# Patient Record
Sex: FEMALE | Race: WHITE
[De-identification: names, ages, dates, MRNs, and addresses within clinical notes are randomized per-mention and may not be internally consistent; named-entity substitution may affect disease eponyms.]

---

## 2017-12-15 ENCOUNTER — HOSPITAL ENCOUNTER (OUTPATIENT)
Dept: HOSPITAL 22 - RAD | Age: 82
End: 2017-12-15
Attending: INTERNAL MEDICINE
Payer: MEDICARE

## 2017-12-15 DIAGNOSIS — M54.6: Primary | ICD-10-CM

## 2017-12-15 DIAGNOSIS — J44.9: ICD-10-CM

## 2017-12-15 NOTE — RADIOLOGY REPORT PS360
CHEST(2 VIEWS-NOT PORTABLE)***
 
HISTORY:    
COPD, MID BACK PAIN
ORDERING PHYSICIAN: Rajiv Duffy MD
PATIENT AGE:  88 years
 
 
COMPARISON: 11/26/2012
 
FINDINGS: Normal heart size. There is increased density in the left hilum.
Developing pulmonary nodule or adenopathy is a consideration. Suggest chest CT
for more thorough evaluation. There is kyphosis of the upper thoracic spine
similar to the previous exam. No acute bony anomalies are evident. There is some
increased density over the L1 vertebral body on the lateral view. A
pleural-based nodule or an area of consolidation is considered.
 
IMPRESSION:
1. Fullness in the left hilum which may be due to adenopathy or developing
nodule.
2. Increased density in the lung base posteriorly on the lateral view which may
be due to pleural-based nodule or consolidation.
 
Suggest CT of the chest without and with contrast for further evaluation

## 2017-12-15 NOTE — RADIOLOGY REPORT PS360
EXAM: THORACIC SPINE-3V SWIMMERS****
 
HISTORY: Back pain
COPD, MID BACK PAIN
 
COMPARISON: None
 
FINDINGS: There is kyphosis of the mid to upper thoracic spine. This however is
not significant change from a prior lateral radiograph of 11/26/2012. Mild
multilevel degenerative disc disease is present with minimal wedging of T9, T8,
and T7 not significant change. Endplate osteophytes are present in the
midthoracic spine.
 
There is increased density in the left hilar region suspicious for hilar mass or
adenopathy. Consider chest CT with contrast for further evaluation. There is
mild lumbar curvature convex right.
 
IMPRESSION:
1. No acute fracture.
2. Chronic changes with thoracic kyphosis and spondylosis.
3. Left hilar mass or adenopathy. Suggest CT of the chest without and with
contrast for further evaluation

## 2018-01-26 ENCOUNTER — TRANSCRIBE ORDERS (OUTPATIENT)
Dept: CARDIAC SURGERY | Facility: CLINIC | Age: 83
End: 2018-01-26

## 2018-01-26 ENCOUNTER — OFFICE VISIT (OUTPATIENT)
Dept: CARDIAC SURGERY | Facility: CLINIC | Age: 83
End: 2018-01-26

## 2018-01-26 VITALS
DIASTOLIC BLOOD PRESSURE: 60 MMHG | HEART RATE: 53 BPM | SYSTOLIC BLOOD PRESSURE: 117 MMHG | OXYGEN SATURATION: 92 % | BODY MASS INDEX: 21.83 KG/M2 | HEIGHT: 62 IN | WEIGHT: 118.6 LBS

## 2018-01-26 DIAGNOSIS — R91.1 LUNG NODULE: Primary | ICD-10-CM

## 2018-01-26 DIAGNOSIS — I71.22 AORTIC ARCH ANEURYSM (HCC): ICD-10-CM

## 2018-01-26 DIAGNOSIS — R91.8 MULTIPLE LUNG NODULES: Primary | ICD-10-CM

## 2018-01-26 PROCEDURE — 99203 OFFICE O/P NEW LOW 30 MIN: CPT | Performed by: THORACIC SURGERY (CARDIOTHORACIC VASCULAR SURGERY)

## 2018-01-26 RX ORDER — ASPIRIN 81 MG/1
81 TABLET ORAL DAILY
COMMUNITY

## 2018-01-26 RX ORDER — SIMVASTATIN 20 MG
25 TABLET ORAL
COMMUNITY
Start: 2018-01-10

## 2018-01-26 RX ORDER — AMLODIPINE BESYLATE 5 MG/1
5 TABLET ORAL
COMMUNITY
Start: 2017-11-22

## 2018-01-26 RX ORDER — LEVOTHYROXINE SODIUM 0.07 MG/1
75 TABLET ORAL
COMMUNITY
Start: 2018-01-25

## 2018-02-07 PROBLEM — I71.22 AORTIC ARCH ANEURYSM (HCC): Status: ACTIVE | Noted: 2018-02-07

## 2018-02-07 PROBLEM — R91.8 MULTIPLE LUNG NODULES: Status: ACTIVE | Noted: 2018-02-07

## 2018-03-07 ENCOUNTER — TELEPHONE (OUTPATIENT)
Dept: CARDIAC SURGERY | Facility: CLINIC | Age: 83
End: 2018-03-07

## 2018-03-07 NOTE — TELEPHONE ENCOUNTER
S/w pts son told him I am still awaiting word from Dr Church on further instructions on her biopsy. Will try to coordinate with him today

## 2018-03-12 DIAGNOSIS — R91.1 LUNG NODULE: Primary | ICD-10-CM

## 2018-03-15 ENCOUNTER — HOSPITAL ENCOUNTER (OUTPATIENT)
Dept: CT IMAGING | Facility: HOSPITAL | Age: 83
Discharge: HOME | End: 2018-03-15
Admitting: PHYSICIAN ASSISTANT

## 2018-03-15 DIAGNOSIS — R91.1 LUNG NODULE: ICD-10-CM

## 2018-03-15 LAB — CREAT BLDA-MCNC: 0.9 MG/DL (ref 0.6–1.3)

## 2018-03-15 PROCEDURE — 0 IOPAMIDOL 61 % SOLUTION: Performed by: PHYSICIAN ASSISTANT

## 2018-03-15 PROCEDURE — 82565 ASSAY OF CREATININE: CPT

## 2018-03-15 PROCEDURE — 71270 CT THORAX DX C-/C+: CPT

## 2018-03-15 RX ADMIN — IOPAMIDOL 80 ML: 612 INJECTION, SOLUTION INTRAVENOUS at 14:30

## 2018-04-10 ENCOUNTER — TELEPHONE (OUTPATIENT)
Dept: CARDIAC SURGERY | Facility: CLINIC | Age: 83
End: 2018-04-10

## 2018-04-27 ENCOUNTER — OFFICE VISIT (OUTPATIENT)
Dept: CARDIAC SURGERY | Facility: CLINIC | Age: 83
End: 2018-04-27

## 2018-04-27 VITALS
WEIGHT: 113 LBS | OXYGEN SATURATION: 96 % | SYSTOLIC BLOOD PRESSURE: 99 MMHG | HEART RATE: 62 BPM | HEIGHT: 63 IN | BODY MASS INDEX: 20.02 KG/M2 | DIASTOLIC BLOOD PRESSURE: 49 MMHG

## 2018-04-27 DIAGNOSIS — R91.8 MULTIPLE LUNG NODULES: Primary | ICD-10-CM

## 2018-04-27 PROCEDURE — 99213 OFFICE O/P EST LOW 20 MIN: CPT | Performed by: THORACIC SURGERY (CARDIOTHORACIC VASCULAR SURGERY)

## 2018-09-24 ENCOUNTER — TELEPHONE (OUTPATIENT)
Dept: CARDIAC SURGERY | Facility: CLINIC | Age: 83
End: 2018-09-24

## 2018-12-20 ENCOUNTER — TELEPHONE (OUTPATIENT)
Dept: CARDIAC SURGERY | Facility: CLINIC | Age: 83
End: 2018-12-20